# Patient Record
Sex: MALE | Race: OTHER | Employment: FULL TIME | URBAN - NONMETROPOLITAN AREA
[De-identification: names, ages, dates, MRNs, and addresses within clinical notes are randomized per-mention and may not be internally consistent; named-entity substitution may affect disease eponyms.]

---

## 2024-06-06 ENCOUNTER — OFFICE VISIT (OUTPATIENT)
Dept: URGENT CARE | Facility: CLINIC | Age: 24
End: 2024-06-06
Payer: COMMERCIAL

## 2024-06-06 VITALS
TEMPERATURE: 98.8 F | BODY MASS INDEX: 22.98 KG/M2 | WEIGHT: 143 LBS | HEART RATE: 77 BPM | OXYGEN SATURATION: 99 % | RESPIRATION RATE: 17 BRPM | HEIGHT: 66 IN

## 2024-06-06 DIAGNOSIS — A08.4 VIRAL GASTROENTERITIS: Primary | ICD-10-CM

## 2024-06-06 PROCEDURE — 99203 OFFICE O/P NEW LOW 30 MIN: CPT

## 2024-06-06 PROCEDURE — S9088 SERVICES PROVIDED IN URGENT: HCPCS

## 2024-06-06 RX ORDER — VENLAFAXINE HYDROCHLORIDE 75 MG/1
75 CAPSULE, EXTENDED RELEASE ORAL DAILY
COMMUNITY

## 2024-06-06 RX ORDER — ONDANSETRON 4 MG/1
4 TABLET, FILM COATED ORAL EVERY 8 HOURS PRN
Qty: 20 TABLET | Refills: 0 | Status: SHIPPED | OUTPATIENT
Start: 2024-06-06

## 2024-06-06 RX ORDER — ARIPIPRAZOLE 10 MG/1
10 TABLET ORAL DAILY
COMMUNITY

## 2024-06-06 NOTE — LETTER
June 6, 2024     Patient: Kannan Meyers   YOB: 2000   Date of Visit: 6/6/2024       To Whom it May Concern:    Kannan Meyers was seen in my clinic on 6/6/2024. He may return to work on 6/7/2024.    If you have any questions or concerns, please don't hesitate to call.         Sincerely,          Frederic Bingham PA-C        CC: No Recipients

## 2024-06-06 NOTE — PROGRESS NOTES
[facility]        NAME: Kannan Meyers is a 23 y.o. male  : 2000    MRN: [id number]  DATE: 2024  TIME: 3:49 PM    Assessment and Plan   Viral gastroenteritis [A08.4]  1. Viral gastroenteritis  ondansetron (ZOFRAN) 4 mg tablet        Stomach cramping and nausea without vomiting that began yesterday.  Sitting in the room comfortably with no acute distress.  No abdominal tenderness on exam.  Normal bowel sounds.  Will send in Zofran help with nausea.  Advised to follow-up with family doctor.  Advised with ER if any symptoms worsen.    Patient Instructions     Take zofran as prescribed  Fluids (pedialyte) and rest  Broths and clear soups  BRAT diet (bananas, rice, applesauce, and toast)  Progress to normal diet as tolerated  Avoid dairy while symptoms persist  Tylenol for pain/fever   Disinfect common household surfaces, do not share drinks, clean dishes in hot soap and water ( is best), and avoid preparing food for an additional week. Virus may continue to spread after symptoms have resolved.    Follow up with PCP in 3-5 days.  Proceed to  ER if symptoms worsen.    If tests are performed, our office will contact you with results only if changes need to made to the care plan discussed with you at the visit. [facility]    Chief Complaint     Chief Complaint   Patient presents with    Abdominal Pain     Abdominal pain with nausea that started last night. Needs work note          History of Present Illness       23-year-old male presents the clinic with diffuse stomach cramping and nausea that began yesterday.  Denies new or cold foods.  Denies sick contacts.  Denies prior GI history or surgeries.  States that ginger ale helped at home.  Admits to poor appetite.  Denies fever, chills, cough, sore throat, chest pain, shortness of breath, diarrhea, vomiting, blood in stool.    Abdominal Pain  Associated symptoms include nausea. Pertinent  "negatives include no constipation, diarrhea, fever or vomiting.       Review of Systems   Review of Systems   Constitutional:  Positive for appetite change. Negative for chills, diaphoresis, fatigue and fever.   HENT: Negative.     Respiratory: Negative.     Cardiovascular: Negative.    Gastrointestinal:  Positive for abdominal pain and nausea. Negative for blood in stool, constipation, diarrhea and vomiting.   Genitourinary: Negative.    Musculoskeletal: Negative.    Skin: Negative.    Neurological: Negative.          Current Medications       Current Outpatient Medications:     ARIPiprazole (Abilify) 10 mg tablet, Take 10 mg by mouth daily, Disp: , Rfl:     ondansetron (ZOFRAN) 4 mg tablet, Take 1 tablet (4 mg total) by mouth every 8 (eight) hours as needed for nausea or vomiting, Disp: 20 tablet, Rfl: 0    venlafaxine (EFFEXOR-XR) 75 mg 24 hr capsule, Take 75 mg by mouth daily, Disp: , Rfl:     Current Allergies     Allergies as of 06/06/2024    (No Known Allergies)            The following portions of the patient's history were reviewed and updated as appropriate: allergies, current medications, past family history, past medical history, past social history, past surgical history and problem list.     History reviewed. No pertinent past medical history.    History reviewed. No pertinent surgical history.    History reviewed. No pertinent family history.      Medications have been verified.        Objective   Pulse 77   Temp 98.8 °F (37.1 °C)   Resp 17   Ht 5' 6\" (1.676 m)   Wt 64.9 kg (143 lb)   SpO2 99%   BMI 23.08 kg/m²        Physical Exam     Physical Exam  Constitutional:       General: He is not in acute distress.     Appearance: Normal appearance. He is not ill-appearing, toxic-appearing or diaphoretic.   HENT:      Head: Normocephalic and atraumatic.      Mouth/Throat:      Mouth: Mucous membranes are moist.      Pharynx: Oropharynx is clear. No posterior oropharyngeal erythema.   Eyes:      " Extraocular Movements: Extraocular movements intact.      Conjunctiva/sclera: Conjunctivae normal.      Pupils: Pupils are equal, round, and reactive to light.   Cardiovascular:      Rate and Rhythm: Normal rate and regular rhythm.      Pulses: Normal pulses.      Heart sounds: Normal heart sounds.   Pulmonary:      Effort: Pulmonary effort is normal. No respiratory distress.      Breath sounds: Normal breath sounds. No stridor. No wheezing, rhonchi or rales.   Chest:      Chest wall: No tenderness.   Abdominal:      General: Abdomen is flat. Bowel sounds are normal. There is no distension. There are no signs of injury.      Palpations: Abdomen is soft. There is no shifting dullness, fluid wave or mass.      Tenderness: There is no abdominal tenderness. There is no guarding or rebound. Negative signs include Olivares's sign, Rovsing's sign and McBurney's sign.   Lymphadenopathy:      Cervical: No cervical adenopathy.   Skin:     General: Skin is warm and dry.      Capillary Refill: Capillary refill takes less than 2 seconds.      Coloration: Skin is not pale.      Findings: No rash.   Neurological:      General: No focal deficit present.      Mental Status: He is alert and oriented to person, place, and time.   Psychiatric:         Mood and Affect: Mood normal.

## 2024-06-06 NOTE — PATIENT INSTRUCTIONS
Take zofran as prescribed  Fluids (pedialyte) and rest  Broths and clear soups  BRAT diet (bananas, rice, applesauce, and toast)  Progress to normal diet as tolerated  Avoid dairy while symptoms persist  Tylenol for pain/fever   Disinfect common household surfaces, do not share drinks, clean dishes in hot soap and water ( is best), and avoid preparing food for an additional week. Virus may continue to spread after symptoms have resolved.    Follow up with PCP in 3-5 days.  Proceed to  ER if symptoms worsen.    If tests are performed, our office will contact you with results only if changes need to made to the care plan discussed with you at the visit. [facility].  Gastroenteritis   WHAT YOU NEED TO KNOW:   Gastroenteritis, or stomach flu, is an infection of the stomach and intestines.        DISCHARGE INSTRUCTIONS:   Call 911 for any of the following:   You have trouble breathing or a very fast pulse.      Return to the emergency department if:   You see blood in your diarrhea.    You cannot stop vomiting.    You have not urinated for 12 hours.     You feel like you are going to faint.    Contact your healthcare provider if:   You have a fever.    You continue to vomit or have diarrhea, even after treatment.    You see worms in your diarrhea.    Your mouth or eyes are dry. You are not urinating as much or as often.    You have questions or concerns about your condition or care.    Medicines:   Medicines  may be given to stop vomiting or diarrhea, decrease abdominal cramps, or treat an infection.    Take your medicine as directed.  Contact your healthcare provider if you think your medicine is not helping or if you have side effects. Tell your provider if you are allergic to any medicine. Keep a list of the medicines, vitamins, and herbs you take. Include the amounts, and when and why you take them. Bring the list or the pill bottles to follow-up visits. Carry  your medicine list with you in case of an emergency.    Manage your symptoms:   Drink liquids as directed.  Ask your healthcare provider how much liquid to drink each day, and which liquids are best for you. You may also need to drink an oral rehydration solution (ORS). An ORS has the right amounts of sugar, salt, and minerals in water to replace body fluids.    Eat bland foods.  When you feel hungry, begin eating soft, bland foods. Examples are bananas, clear soup, potatoes, and applesauce. Do not have dairy products, alcohol, sugary drinks, or drinks with caffeine until you feel better.    Rest as much as possible.  Slowly start to do more each day when you begin to feel better.    Prevent the spread of gastroenteritis:  Gastroenteritis can spread easily. Keep yourself, your family, and your surroundings clean to help prevent the spread of gastroenteritis:  Wash your hands often.  Use soap and water. Wash your hands after you use the bathroom, change a child's diapers, or sneeze. Wash your hands before you prepare or eat food.         Clean surfaces and do laundry often.  Wash your clothes and towels separately from the rest of the laundry. Clean surfaces in your home with antibacterial  or bleach.    Clean food thoroughly and cook safely.  Wash raw vegetables before you cook. Cook meat, fish, and eggs fully. Do not use the same dishes for raw meat as you do for other foods. Refrigerate any leftover food immediately.    Be aware when you camp or travel.  Drink only clean water. Do not drink from rivers or lakes unless you purify or boil the water first. When you travel, drink bottled water and do not add ice. Do not eat fruit that has not been peeled. Do not eat raw fish or meat that is not fully cooked.    Follow up with your doctor as directed:  Write down your questions so you remember to ask them during your visits.  © Copyright [employer] 2023 Information is for End User's use only and may not be sold,  redistributed or otherwise used for commercial purposes.  The above information is an  only. It is not intended as medical advice for individual conditions or treatments. Talk to your doctor, nurse or pharmacist before following any medical regimen to see if it is safe and effective for you.